# Patient Record
Sex: MALE | Race: BLACK OR AFRICAN AMERICAN | NOT HISPANIC OR LATINO | URBAN - METROPOLITAN AREA
[De-identification: names, ages, dates, MRNs, and addresses within clinical notes are randomized per-mention and may not be internally consistent; named-entity substitution may affect disease eponyms.]

---

## 2017-12-09 ENCOUNTER — HOSPITAL ENCOUNTER (EMERGENCY)
Facility: HOSPITAL | Age: 42
Discharge: HOME | End: 2017-12-09
Attending: EMERGENCY MEDICINE
Payer: COMMERCIAL

## 2017-12-09 VITALS
DIASTOLIC BLOOD PRESSURE: 94 MMHG | RESPIRATION RATE: 18 BRPM | HEIGHT: 70 IN | TEMPERATURE: 98 F | OXYGEN SATURATION: 99 % | SYSTOLIC BLOOD PRESSURE: 144 MMHG | BODY MASS INDEX: 34.36 KG/M2 | WEIGHT: 240 LBS | HEART RATE: 105 BPM

## 2017-12-09 DIAGNOSIS — S83.91XA SPRAIN OF RIGHT KNEE, UNSPECIFIED LIGAMENT, INITIAL ENCOUNTER: ICD-10-CM

## 2017-12-09 DIAGNOSIS — S86.892A SHIN SPLINTS, LEFT, INITIAL ENCOUNTER: Primary | ICD-10-CM

## 2017-12-09 PROCEDURE — 29505 APPLICATION LONG LEG SPLINT: CPT | Mod: LT

## 2017-12-09 PROCEDURE — 99283 EMERGENCY DEPT VISIT LOW MDM: CPT | Mod: 25

## 2017-12-09 RX ORDER — NAPROXEN 500 MG/1
500 TABLET ORAL 2 TIMES DAILY WITH MEALS
Qty: 60 TABLET | Refills: 0 | Status: SHIPPED | OUTPATIENT
Start: 2017-12-09

## 2017-12-09 RX ORDER — LISINOPRIL 5 MG/1
5 TABLET ORAL DAILY
COMMUNITY

## 2017-12-10 NOTE — ED NOTES
Pt. presents with increased tenderness to L anterior shin and R lateral knee after running/exercising. Pt. appears to be physically fit. No obvious swelling, redness or warmth noted to affected extremities. FROM. Reports pain with movement.

## 2017-12-10 NOTE — ED PROVIDER NOTES
NAME:  Thelma Tom  CSN:     [id number]  MRN:    [id number]  ADMIT DATE: 12/9/2017        eMERGENCY dEPARTMENT eNCOUnter    CHIEF COMPLAINT    Chief Complaint   Patient presents with    Leg Pain     reports 11/14 injured right knee while running. Reports pain to left calf since 11/22 while running. Pt ambulatory to triage, steady gait.        HPI      Thelma Tom is a 42 y.o. male who presents to the ED for evaluation of knee pain.  Patient states that he has been exercising on the V running up and down and was approximately 3 weeks ago he felt a pop in the right knee and subsequently has been having pain that is worse at the end of the day every day after going up and down stairs for work.  He has been able to ambulate on the knee or the entire time.  Patient has been continuing to use the knee and exercising on the [place] after the injury.  He states that about a week ago he began having pain in the anterior left lower leg that is worse with ambulation.  He has been able to ambulate after risks of these injuries.  Patient has not been taking any NSAIDs.  He has not seen his primary care doctor or an orthopedist.        ALLERGIES    Review of patient's allergies indicates:  No Known Allergies    PAST MEDICAL HISTORY  Past Medical History:   Diagnosis Date    Asthma     Hypertension        SURGICAL HISTORY    Past Surgical History:   Procedure Laterality Date    WRIST SURGERY Left 2000       SOCIAL HISTORY    Social History     Social History    Marital status: Unknown     Spouse name: N/A    Number of children: N/A    Years of education: N/A     Social History Main Topics    Smoking status: Current Some Day Smoker     Packs/day: 0.50    Smokeless tobacco: Never Used    Alcohol use No    Drug use: No    Sexual activity: Not Asked     Other Topics Concern    None     Social History Narrative    None       FAMILY HISTORY    History reviewed. No pertinent family history.    REVIEW OF SYSTEMS   ROS  All Systems  "otherwise negative except as noted in the History of Present Illness.        PHYSICAL EXAM    Reviewed Triage Note  VITAL SIGNS:   ED Triage Vitals [12/09/17 2020]   Enc Vitals Group      BP (!) 144/94      Pulse (!) 117      Resp 18      Temp 97.8 °F (36.6 °C)      Temp src Oral      SpO2 97 %      Weight 240 lb      Height 5' 10"      Head Circumference       Peak Flow       Pain Score       Pain Loc       Pain Edu?       Excl. in GC?        Patient Vitals for the past 24 hrs:   BP Temp Temp src Pulse Resp SpO2 Height Weight   12/09/17 2020 (!) 144/94 97.8 °F (36.6 °C) Oral (!) 117 18 97 % 5' 10" (1.778 m) 108.9 kg (240 lb)           Physical Exam    Constitutional:  Well-developed, well-nourished.  HENT:  Normocephalic, atraumatic.  Eyes:  EOMI. Conjunctiva normal without discharge.   Neck: Normal range of motion. No stridor. No meningismus.  Musculoskeletal:  No instability to the right knee, no swelling, there is tenderness to the lateral inferior aspect of the patella, there is tenderness to the anterior compartment of the left lower leg, the calf is supple, Greene test is negative, normal DP pulse to the left foot  Integument:  Warm and dry. No rash.  Neurologic:  Normal motor function. Normal sensory function. No focal deficits noted. Alert and Interactive.  Psychiatric:  Affect normal. Mood normal.         LABS  Pertinent labs reviewed. (See chart for details)   Labs Reviewed - No data to display      RADIOLOGY    Imaging Results    None         PROCEDURES    Procedures      EKG     Interpreted by ERP:         ED COURSE & MEDICAL DECISION MAKING    Pertinent & Imaging studies reviewed. (See chart for details and specific orders.)        Medications - No data to display    ED Course      This patient is suffering from patient's splint and a possible knee sprain versus tendinitis versus meniscus tear.  The patient was educated on rice therapy.  I will provide any immobilizer for the right leg and Crutches " so that he can reduce the use of the knee.  Patient is instructed to follow-up with the orthopedist for an MRI       DISPOSITION  Patient discharged in stable condition at No discharge date for patient encounter.      DISCHARGE INSTRUCTIONS & MEDS     Thelma Tom   Home Medication Instructions RENNY:[id number]    Printed on:12/09/17 2127   Medication Information                      lisinopril (PRINIVIL,ZESTRIL) 5 MG tablet  Take 5 mg by mouth once daily.                   New Prescriptions    NAPROXEN (NAPROSYN) 500 MG TABLET    Take 1 tablet (500 mg total) by mouth 2 (two) times daily with meals.           FINAL IMPRESSION    1. Shin splints, left, initial encounter    2. Sprain of right knee, unspecified ligament, initial encounter              Blood Pressure Follow-Up Advised  Patient advised to follow up with PCP within 3-5 days for blood pressure re-check if blood pressure is equal to or greater than 120/80.         Critical care time spent with this patient (not including separately billable items) was  0 minutes.     DISCLAIMER: This note was prepared with Dragon NaturallySpeaking voice recognition transcription software. Garbled syntax, mangled pronouns, and other bizarre constructions may be attributed to that software system.      Kyle Ness MD  12/09/2017  9:27 PM          Kyle Ness MD  12/09/17 2137